# Patient Record
Sex: FEMALE | Race: BLACK OR AFRICAN AMERICAN | Employment: UNEMPLOYED | URBAN - METROPOLITAN AREA
[De-identification: names, ages, dates, MRNs, and addresses within clinical notes are randomized per-mention and may not be internally consistent; named-entity substitution may affect disease eponyms.]

---

## 2021-02-02 LAB
ANTIBODY SCREEN, EXTERNAL: NEGATIVE
CHLAMYDIA, EXTERNAL: NEGATIVE
HBSAG, EXTERNAL: NEGATIVE
HIV, EXTERNAL: NORMAL
N. GONORRHEA, EXTERNAL: NEGATIVE
RPR, EXTERNAL: NORMAL
RUBELLA, EXTERNAL: NORMAL
TYPE, ABO & RH, EXTERNAL: NORMAL

## 2021-08-03 LAB — GRBS, EXTERNAL: NEGATIVE

## 2021-08-24 ENCOUNTER — HOSPITAL ENCOUNTER (INPATIENT)
Age: 31
LOS: 1 days | Discharge: HOME | DRG: 560 | End: 2021-08-25
Attending: SPECIALIST | Admitting: SPECIALIST
Payer: MEDICAID

## 2021-08-24 DIAGNOSIS — G89.18 POST-OP PAIN: Primary | ICD-10-CM

## 2021-08-24 LAB
ABO + RH BLD: NORMAL
AMPHET UR QL SCN: NEGATIVE
BARBITURATES UR QL SCN: NEGATIVE
BASOPHILS # BLD: 0 K/UL (ref 0–0.1)
BASOPHILS NFR BLD: 0 % (ref 0–1)
BENZODIAZ UR QL: NEGATIVE
BLOOD GROUP ANTIBODIES SERPL: NORMAL
CANNABINOIDS UR QL SCN: POSITIVE
COCAINE UR QL SCN: NEGATIVE
DIFFERENTIAL METHOD BLD: ABNORMAL
DRUG SCRN COMMENT,DRGCM: ABNORMAL
EOSINOPHIL # BLD: 0.1 K/UL (ref 0–0.4)
EOSINOPHIL NFR BLD: 1 % (ref 0–7)
ERYTHROCYTE [DISTWIDTH] IN BLOOD BY AUTOMATED COUNT: 13 % (ref 11.5–14.5)
HCT VFR BLD AUTO: 33.3 % (ref 35–47)
HGB BLD-MCNC: 11 G/DL (ref 11.5–16)
IMM GRANULOCYTES # BLD AUTO: 0 K/UL (ref 0–0.04)
IMM GRANULOCYTES NFR BLD AUTO: 0 % (ref 0–0.5)
LYMPHOCYTES # BLD: 2.7 K/UL (ref 0.8–3.5)
LYMPHOCYTES NFR BLD: 44 % (ref 12–49)
MCH RBC QN AUTO: 29.3 PG (ref 26–34)
MCHC RBC AUTO-ENTMCNC: 33 G/DL (ref 30–36.5)
MCV RBC AUTO: 88.6 FL (ref 80–99)
METHADONE UR QL: NEGATIVE
MONOCYTES # BLD: 0.6 K/UL (ref 0–1)
MONOCYTES NFR BLD: 9 % (ref 5–13)
NEUTS SEG # BLD: 2.8 K/UL (ref 1.8–8)
NEUTS SEG NFR BLD: 46 % (ref 32–75)
NRBC # BLD: 0 K/UL (ref 0–0.01)
NRBC BLD-RTO: 0 PER 100 WBC
OPIATES UR QL: NEGATIVE
PCP UR QL: NEGATIVE
PLATELET # BLD AUTO: 166 K/UL (ref 150–400)
PMV BLD AUTO: 11.4 FL (ref 8.9–12.9)
RBC # BLD AUTO: 3.76 M/UL (ref 3.8–5.2)
SPECIMEN EXP DATE BLD: NORMAL
WBC # BLD AUTO: 6.1 K/UL (ref 3.6–11)

## 2021-08-24 PROCEDURE — 75410000003 HC RECOV DEL/VAG/CSECN EA 0.5 HR

## 2021-08-24 PROCEDURE — 75410000002 HC LABOR FEE PER 1 HR

## 2021-08-24 PROCEDURE — 74011250636 HC RX REV CODE- 250/636

## 2021-08-24 PROCEDURE — 0HQ9XZZ REPAIR PERINEUM SKIN, EXTERNAL APPROACH: ICD-10-PCS | Performed by: SPECIALIST

## 2021-08-24 PROCEDURE — 75410000000 HC DELIVERY VAGINAL/SINGLE

## 2021-08-24 PROCEDURE — 86901 BLOOD TYPING SEROLOGIC RH(D): CPT

## 2021-08-24 PROCEDURE — 85025 COMPLETE CBC W/AUTO DIFF WBC: CPT

## 2021-08-24 PROCEDURE — 65410000002 HC RM PRIVATE OB

## 2021-08-24 PROCEDURE — 74011250636 HC RX REV CODE- 250/636: Performed by: SPECIALIST

## 2021-08-24 PROCEDURE — 80307 DRUG TEST PRSMV CHEM ANLYZR: CPT

## 2021-08-24 PROCEDURE — 36415 COLL VENOUS BLD VENIPUNCTURE: CPT

## 2021-08-24 PROCEDURE — 74011250637 HC RX REV CODE- 250/637: Performed by: SPECIALIST

## 2021-08-24 RX ORDER — CALCIUM CARBONATE 200(500)MG
400 TABLET,CHEWABLE ORAL
Status: DISCONTINUED | OUTPATIENT
Start: 2021-08-24 | End: 2021-08-25 | Stop reason: HOSPADM

## 2021-08-24 RX ORDER — ACETAMINOPHEN 325 MG/1
650 TABLET ORAL
Status: DISCONTINUED | OUTPATIENT
Start: 2021-08-24 | End: 2021-08-25 | Stop reason: HOSPADM

## 2021-08-24 RX ORDER — OXYTOCIN/RINGER'S LACTATE 30/500 ML
1-25 PLASTIC BAG, INJECTION (ML) INTRAVENOUS
Status: DISCONTINUED | OUTPATIENT
Start: 2021-08-24 | End: 2021-08-24

## 2021-08-24 RX ORDER — ACETAMINOPHEN 325 MG/1
650 TABLET ORAL
Status: DISCONTINUED | OUTPATIENT
Start: 2021-08-24 | End: 2021-08-24

## 2021-08-24 RX ORDER — SODIUM CHLORIDE 0.9 % (FLUSH) 0.9 %
5-40 SYRINGE (ML) INJECTION AS NEEDED
Status: DISCONTINUED | OUTPATIENT
Start: 2021-08-24 | End: 2021-08-25 | Stop reason: HOSPADM

## 2021-08-24 RX ORDER — OXYTOCIN/RINGER'S LACTATE 30/500 ML
10 PLASTIC BAG, INJECTION (ML) INTRAVENOUS AS NEEDED
Status: DISCONTINUED | OUTPATIENT
Start: 2021-08-24 | End: 2021-08-25 | Stop reason: HOSPADM

## 2021-08-24 RX ORDER — IBUPROFEN 400 MG/1
800 TABLET ORAL EVERY 8 HOURS
Status: DISCONTINUED | OUTPATIENT
Start: 2021-08-24 | End: 2021-08-25 | Stop reason: HOSPADM

## 2021-08-24 RX ORDER — SODIUM CHLORIDE, SODIUM LACTATE, POTASSIUM CHLORIDE, CALCIUM CHLORIDE 600; 310; 30; 20 MG/100ML; MG/100ML; MG/100ML; MG/100ML
125 INJECTION, SOLUTION INTRAVENOUS CONTINUOUS
Status: DISCONTINUED | OUTPATIENT
Start: 2021-08-24 | End: 2021-08-24

## 2021-08-24 RX ORDER — OXYCODONE AND ACETAMINOPHEN 5; 325 MG/1; MG/1
1 TABLET ORAL
Status: DISCONTINUED | OUTPATIENT
Start: 2021-08-24 | End: 2021-08-25 | Stop reason: HOSPADM

## 2021-08-24 RX ORDER — OXYTOCIN IN 5 % DEXTROSE 30/500 ML
2 PLASTIC BAG, INJECTION (ML) INTRAVENOUS
Status: DISCONTINUED | OUTPATIENT
Start: 2021-08-24 | End: 2021-08-24

## 2021-08-24 RX ORDER — OXYTOCIN/RINGER'S LACTATE 30/500 ML
87.3 PLASTIC BAG, INJECTION (ML) INTRAVENOUS AS NEEDED
Status: DISCONTINUED | OUTPATIENT
Start: 2021-08-24 | End: 2021-08-25 | Stop reason: HOSPADM

## 2021-08-24 RX ORDER — HYDROCORTISONE ACETATE PRAMOXINE HCL 2.5; 1 G/100G; G/100G
CREAM TOPICAL AS NEEDED
Status: DISCONTINUED | OUTPATIENT
Start: 2021-08-24 | End: 2021-08-25 | Stop reason: HOSPADM

## 2021-08-24 RX ORDER — ZOLPIDEM TARTRATE 5 MG/1
5 TABLET ORAL
Status: DISCONTINUED | OUTPATIENT
Start: 2021-08-24 | End: 2021-08-25 | Stop reason: HOSPADM

## 2021-08-24 RX ORDER — NALOXONE HYDROCHLORIDE 0.4 MG/ML
0.4 INJECTION, SOLUTION INTRAMUSCULAR; INTRAVENOUS; SUBCUTANEOUS AS NEEDED
Status: DISCONTINUED | OUTPATIENT
Start: 2021-08-24 | End: 2021-08-25 | Stop reason: HOSPADM

## 2021-08-24 RX ORDER — OXYTOCIN/RINGER'S LACTATE 30/500 ML
PLASTIC BAG, INJECTION (ML) INTRAVENOUS
Status: COMPLETED
Start: 2021-08-24 | End: 2021-08-24

## 2021-08-24 RX ADMIN — SODIUM CHLORIDE, POTASSIUM CHLORIDE, SODIUM LACTATE AND CALCIUM CHLORIDE 125 ML/HR: 600; 310; 30; 20 INJECTION, SOLUTION INTRAVENOUS at 07:00

## 2021-08-24 RX ADMIN — Medication 1 MILLI-UNITS/MIN: at 08:08

## 2021-08-24 RX ADMIN — IBUPROFEN 800 MG: 400 TABLET ORAL at 22:29

## 2021-08-24 RX ADMIN — IBUPROFEN 800 MG: 400 TABLET ORAL at 14:05

## 2021-08-24 RX ADMIN — OXYTOCIN 1 MILLI-UNITS/MIN: 10 INJECTION INTRAVENOUS at 08:08

## 2021-08-24 NOTE — PROGRESS NOTES
[id number] Patient arrived to unit ambulatory, in stable condition, with SO at her side. 0715 Report given to JUANJOSE Young RN to assume care of patient at this time.

## 2021-08-24 NOTE — PROCEDURES
of a viable male infant over an intact perineum in OA position. No nuchal cord, no shoulder dystocia. Placenta del spont, intact. 1st degree rt post vag laceration hemostatic. EBL 150ml.

## 2021-08-24 NOTE — H&P
History & Physical    Name: Chandra Valle MRN: [id number]  SSN: [id number]    YOB: 1990  Age: 31 y.o. Sex: female      Subjective:     Estimated Date of Delivery: 21  OB History    Para Term  AB Living   6 4   4 1 4   SAB TAB Ectopic Molar Multiple Live Births   1       0 4      # Outcome Date GA Lbr Neno/2nd Weight Sex Delivery Anes PTL Lv   6 Current            5 SAB 20 11w0d          4  16 36w1d 03:30 / 00:05 2.495 kg M VAGINAL DELI None Y SURINDER   3  03/28/15 35w6d 00:20 / 00:06 2.24 kg M VAGINAL DELI None Y SURINDER      Complications: Abruptio Placenta, Other (comment)   2  01/15/13 35w5d 01:20 / 00:02 2.13 kg M VAGINAL DELI None Y SURINDER   1   35w5d 02:47 2.345 kg M VAGINAL DELI None N SURINDER      Birth Comments: Premature 35-36 weeks  Negative GBS  Referred left ear; passed follow-up hearing screening  Normal NB metabolic screening       Ms. Phil Leyden is admitted with pregnancy at 39w0d for induction of labor due to favorable cervix at term. Prenatal course was normal.  Please see prenatal records for details.     Past Medical History:   Diagnosis Date    Abnormal Pap smear     Abnormal Papanicolaou smear of cervix     Chlamydia     Gonorrhea      Past Surgical History:   Procedure Laterality Date    HX OTHER SURGICAL      tonsillectomy, adenoidectomy     Social History     Occupational History    Not on file   Tobacco Use    Smoking status: Former Smoker   Substance and Sexual Activity    Alcohol use: Not Currently     Comment: social    Drug use: No     Comment: Marijuana use on prenatal.    Sexual activity: Yes     Partners: Male     Birth control/protection: None     Family History   Problem Relation Age of Onset    Diabetes Mother     Alcohol abuse Maternal Aunt     Diabetes Maternal Aunt     Hypertension Maternal Aunt     Hypertension Maternal Grandmother     Stroke Maternal Grandfather        Allergies   Allergen Reactions    Amoxicillin Hives     Prior to Admission medications    Medication Sig Start Date End Date Taking? Authorizing Provider   prenatal multivit-ca-min-fe-fa tab Take  by mouth. Yes Provider, Historical   HYDROcodone-acetaminophen (VICODIN) 5-300 mg tablet Take 1 Tab by mouth every four (4) hours as needed. Max Daily Amount: 6 Tabs. Patient not taking: Reported on 8/24/2021 5/11/16   Keith Dasilva MD   ibuprofen (MOTRIN IB) 200 mg tablet Take 4 Tabs by mouth every eight (8) hours as needed for Pain. Patient not taking: Reported on 8/24/2021 5/11/16   Keith Dasilva MD        Review of Systems: A comprehensive review of systems was negative except for that written in the History of Present Illness. Objective:     Vitals:  Vitals:    08/24/21 0649 08/24/21 0650 08/24/21 0654 08/24/21 0725   BP:  131/89     Pulse: 83 83     Resp:  18     Temp:  98.3 °F (36.8 °C)     SpO2: 100% 100%     Weight:   80.7 kg (178 lb) 80.7 kg (178 lb)   Height:   5' (1.524 m) 5' (1.524 m)        Physical Exam:  Patient without distress. Heart: Regular rate and rhythm or S1S2 present  Lung: clear to auscultation throughout lung fields, no wheezes, no rales, no rhonchi and normal respiratory effort  Abdomen: soft, nontender  Cervical Exam: 4 cm dilated    70% effaced    -1 station    Presenting Part: cephalic  Cervical Position: mid position  Lower Extremities:  - Edema 1+  Membranes:  Artificial Rupture of Membranes;  Amniotic Fluid: medium amount of clear fluid  Fetal Heart Rate: Reactive          Prenatal Labs:   Lab Results   Component Value Date/Time    Rubella, External 1.60 immune 11/18/2015 12:00 AM    HBsAg, External NEGATIVE 09/10/2014 12:00 AM    HIV, External non reactive 11/18/2015 12:00 AM    RPR, External non reactive 11/18/2015 12:00 AM    Gonorrhea, External negative 11/18/2015 12:00 AM    Chlamydia, External negative 11/18/2015 12:00 AM    ABO,Rh O positive 11/16/2015 12:00 AM    GrBStrep, External pending  05/05/2016 12:00 AM       Impression/Plan:     Active Problems:    * No active hospital problems. *       Plan: Admit for induction of labor. Group B Strep negative.

## 2021-08-24 NOTE — PROGRESS NOTES
Bedside shift change report given to JUANJOSE Quinones (oncoming nurse) by LUCY Peterson RN (offgoing nurse). Report included the following information SBAR, Kardex, OR Summary, Procedure Summary, Intake/Output and MAR.

## 2021-08-24 NOTE — PROGRESS NOTES
0715: Oncoming SBAR report received from Kenzie Green RN. Pt. Care assumed at this time. 0720: Educated pt. on COVID swab and vaccine. Pt. Refusing both at this time. 0610: Dr. Pari Licona at bedside. Strip reviewed. SVE performed, 3/70/-1 with AROM. POC discussed, plan to start pitocin. 1028: Pt. Complaints of feeling pressure. SVE performed, 4cm. 1145: Pt. Complaints of feeling like she needs to push. SVE performed, 4cm. 1221: Dr. Marycarmen Smith at bedside. Strip reviewed. SVE performed, 8cm. 1234: Pt. Complaints of feeling the urge to push. Pt. Complete. Dr. Marycarmen Smith notified. 1235: Dr. Marycarmen Smith at bedside.  of live male infant by Dr. Marycarmen Smith.     1400: SBAR report given to LUCY Mott RN. Pt. Care turned over at this time.

## 2021-08-25 VITALS
RESPIRATION RATE: 18 BRPM | WEIGHT: 178 LBS | OXYGEN SATURATION: 99 % | DIASTOLIC BLOOD PRESSURE: 86 MMHG | BODY MASS INDEX: 34.95 KG/M2 | SYSTOLIC BLOOD PRESSURE: 130 MMHG | HEIGHT: 60 IN | TEMPERATURE: 98.1 F | HEART RATE: 72 BPM

## 2021-08-25 PROCEDURE — 74011250637 HC RX REV CODE- 250/637: Performed by: SPECIALIST

## 2021-08-25 RX ORDER — IBUPROFEN 800 MG/1
800 TABLET ORAL
Qty: 20 TABLET | Refills: 0 | Status: SHIPPED | OUTPATIENT
Start: 2021-08-25

## 2021-08-25 RX ORDER — OXYCODONE AND ACETAMINOPHEN 5; 325 MG/1; MG/1
1 TABLET ORAL
Qty: 10 TABLET | Refills: 0 | Status: SHIPPED | OUTPATIENT
Start: 2021-08-25 | End: 2021-08-28

## 2021-08-25 RX ADMIN — IBUPROFEN 800 MG: 400 TABLET ORAL at 15:22

## 2021-08-25 NOTE — DISCHARGE INSTRUCTIONS

## 2021-08-25 NOTE — PROGRESS NOTES
Post-Partum Day Number 1 Progress/Discharge Note    Patient doing well post-partum without significant complaint. Voiding without difficulty, normal lochia, positive flatus. Vitals:  Patient Vitals for the past 8 hrs:   BP Temp Pulse Resp SpO2   21 0437 113/78 98.2 °F (36.8 °C) 76 16 100 %     Temp (24hrs), Av.4 °F (36.9 °C), Min:97.9 °F (36.6 °C), Max:99 °F (37.2 °C)      Vital signs stable, afebrile. Exam:  Patient without distress. Abdomen soft, fundus firm at level of umbilicus, non tender               Perineum with normal lochia noted. Lower extremities are negative for swelling, cords or tenderness. Lab/Data Review: All lab results for the last 24 hours reviewed. Assessment and Plan:  Patient appears to be having uncomplicated post-partum course. Continue routine perineal care and maternal education. Plan discharge for today with follow up in our office in 6 weeks.

## 2021-08-30 NOTE — DISCHARGE SUMMARY
Obstetrical Discharge Summary     Name: Vipul Mckeon MRN: [id number]  SSN: [id number]    YOB: 1990  Age: 31 y.o. Sex: female      Allergies: Amoxicillin    Admit Date: 2021    Discharge Date: 2021     Admitting Physician: Gumaro Mattson MD     Attending Physician:  Angela att. providers found     * Admission Diagnoses: Pregnancy [Z34.90]    * Discharge Diagnoses:   Information for the patient's :  Scotty Betancourt [[id number]]   Delivery of a 2.85 kg male infant via Vaginal, Spontaneous on 2021 at 12:35 PM  by Kun Serum. Apgars were 9  and 9 . Additional Diagnoses:   Hospital Problems as of 2021 Never Reviewed        Codes Class Noted - Resolved POA    Pregnancy ICD-10-CM: Z34.90  ICD-9-CM: V22.2  3/28/2015 - Present Unknown             Lab Results   Component Value Date/Time    ABO/Rh(D) O POSITIVE 2021 07:09 AM    Rubella, External 1.52-immune 2021 12:00 AM    GrBStrep, External negative 2021 12:00 AM    ABO,Rh O positive 2021 12:00 AM      Immunization History   Administered Date(s) Administered    Influenza Vaccine 2013       * Procedures:   * No surgery found *           * Discharge Condition: good    * Hospital Course: Normal hospital course following the delivery. * Disposition: Home    Discharge Medications:   Discharge Medication List as of 2021  3:58 PM      START taking these medications    Details   oxyCODONE-acetaminophen (PERCOCET) 5-325 mg per tablet Take 1 Tablet by mouth every six (6) hours as needed for Pain for up to 3 days. Max Daily Amount: 4 Tablets. , Print, Disp-10 Tablet, R-0         CONTINUE these medications which have CHANGED    Details   ibuprofen (MOTRIN) 800 mg tablet Take 1 Tablet by mouth every six (6) hours as needed for Pain.  Indications: pain, Print, Disp-20 Tablet, R-0         CONTINUE these medications which have NOT CHANGED    Details   prenatal multivit-ca-min-fe-fa tab Take  by mouth., Historical Med         STOP taking these medications       HYDROcodone-acetaminophen (VICODIN) 5-300 mg tablet Comments:   Reason for Stopping:               * Follow-up Care/Patient Instructions: Activity: Activity as tolerated  Diet: Regular Diet  Wound Care: None needed    Follow-up Information     Follow up With Specialties Details Why Contact Info    Unknown, Provider, MD    Patient not available to ask        In 6 weeks      Rita Michael MD Obstetrics & Gynecology, Gynecology, Obstetrics In 6 weeks Postpartum check [address] [facility]  [phone]                        .

## 2023-05-11 RX ORDER — IBUPROFEN 800 MG/1
TABLET ORAL EVERY 6 HOURS PRN
COMMUNITY
Start: 2021-08-25